# Patient Record
Sex: FEMALE | Race: WHITE | NOT HISPANIC OR LATINO | Employment: UNEMPLOYED | URBAN - METROPOLITAN AREA
[De-identification: names, ages, dates, MRNs, and addresses within clinical notes are randomized per-mention and may not be internally consistent; named-entity substitution may affect disease eponyms.]

---
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
## 2022-02-23 ENCOUNTER — PATIENT MESSAGE (OUTPATIENT)
Dept: PEDIATRIC GASTROENTEROLOGY | Facility: CLINIC | Age: 22
End: 2022-02-23

## 2022-03-10 ENCOUNTER — PATIENT MESSAGE (OUTPATIENT)
Dept: PEDIATRIC GASTROENTEROLOGY | Facility: CLINIC | Age: 22
End: 2022-03-10

## 2022-11-16 ENCOUNTER — TELEPHONE (OUTPATIENT)
Dept: SURGERY | Facility: CLINIC | Age: 22
End: 2022-11-16
Payer: MEDICAID

## 2022-11-17 ENCOUNTER — OFFICE VISIT (OUTPATIENT)
Dept: SURGERY | Facility: CLINIC | Age: 22
End: 2022-11-17
Payer: MEDICAID

## 2022-11-17 VITALS
SYSTOLIC BLOOD PRESSURE: 104 MMHG | HEART RATE: 89 BPM | HEIGHT: 62 IN | DIASTOLIC BLOOD PRESSURE: 65 MMHG | BODY MASS INDEX: 20 KG/M2 | WEIGHT: 108.69 LBS

## 2022-11-17 DIAGNOSIS — K59.1 FUNCTIONAL DIARRHEA: Primary | ICD-10-CM

## 2022-11-17 DIAGNOSIS — F41.9 ANXIETY: ICD-10-CM

## 2022-11-17 DIAGNOSIS — K62.5 BRBPR (BRIGHT RED BLOOD PER RECTUM): ICD-10-CM

## 2022-11-17 PROCEDURE — 3008F BODY MASS INDEX DOCD: CPT | Mod: CPTII,,, | Performed by: NURSE PRACTITIONER

## 2022-11-17 PROCEDURE — 1159F PR MEDICATION LIST DOCUMENTED IN MEDICAL RECORD: ICD-10-PCS | Mod: CPTII,,, | Performed by: NURSE PRACTITIONER

## 2022-11-17 PROCEDURE — 99999 PR PBB SHADOW E&M-EST. PATIENT-LVL IV: ICD-10-PCS | Mod: PBBFAC,,, | Performed by: NURSE PRACTITIONER

## 2022-11-17 PROCEDURE — 99999 PR PBB SHADOW E&M-EST. PATIENT-LVL IV: CPT | Mod: PBBFAC,,, | Performed by: NURSE PRACTITIONER

## 2022-11-17 PROCEDURE — 3074F SYST BP LT 130 MM HG: CPT | Mod: CPTII,,, | Performed by: NURSE PRACTITIONER

## 2022-11-17 PROCEDURE — 3078F PR MOST RECENT DIASTOLIC BLOOD PRESSURE < 80 MM HG: ICD-10-PCS | Mod: CPTII,,, | Performed by: NURSE PRACTITIONER

## 2022-11-17 PROCEDURE — 1159F MED LIST DOCD IN RCRD: CPT | Mod: CPTII,,, | Performed by: NURSE PRACTITIONER

## 2022-11-17 PROCEDURE — 99205 PR OFFICE/OUTPT VISIT, NEW, LEVL V, 60-74 MIN: ICD-10-PCS | Mod: S$PBB,,, | Performed by: NURSE PRACTITIONER

## 2022-11-17 PROCEDURE — 3074F PR MOST RECENT SYSTOLIC BLOOD PRESSURE < 130 MM HG: ICD-10-PCS | Mod: CPTII,,, | Performed by: NURSE PRACTITIONER

## 2022-11-17 PROCEDURE — 3078F DIAST BP <80 MM HG: CPT | Mod: CPTII,,, | Performed by: NURSE PRACTITIONER

## 2022-11-17 PROCEDURE — 3008F PR BODY MASS INDEX (BMI) DOCUMENTED: ICD-10-PCS | Mod: CPTII,,, | Performed by: NURSE PRACTITIONER

## 2022-11-17 PROCEDURE — 99205 OFFICE O/P NEW HI 60 MIN: CPT | Mod: S$PBB,,, | Performed by: NURSE PRACTITIONER

## 2022-11-17 PROCEDURE — 99214 OFFICE O/P EST MOD 30 MIN: CPT | Mod: PBBFAC | Performed by: NURSE PRACTITIONER

## 2022-11-17 RX ORDER — HYDROCORTISONE 25 MG/G
CREAM TOPICAL 2 TIMES DAILY
Qty: 28 G | Refills: 1 | Status: SHIPPED | OUTPATIENT
Start: 2022-11-17

## 2022-11-17 RX ORDER — AMOXICILLIN 875 MG/1
TABLET, FILM COATED ORAL
COMMUNITY
Start: 2022-11-12 | End: 2023-04-03

## 2022-11-17 RX ORDER — NITROFURANTOIN 25; 75 MG/1; MG/1
100 CAPSULE ORAL
COMMUNITY
End: 2023-04-03

## 2022-11-17 RX ORDER — ALPRAZOLAM 1 MG/1
1 TABLET ORAL
Qty: 1 TABLET | Refills: 0 | Status: SHIPPED | OUTPATIENT
Start: 2022-11-17 | End: 2023-04-03

## 2022-11-17 NOTE — PATIENT INSTRUCTIONS
Start fiber supplement such as Fibercon (capsule) Citrucel or Metamucil every day, increase as tolerated per  instructions to achieve one to three well formed and easy to pass stools per 7 day period  Water intake of 64 oz per day  May use prescribed cortisone cream to hemorrhoids twice a day not to exceed more than 2 weeks at a time. Take 2 week medication vacation holiday then resume as needed.  Warm sitz baths as needed  Do not sit on the toilet for more than 5 mins at a time     Try Michael almonte peppermint capsules for the nausea and abdominal cramping. These can be found on amazon

## 2022-11-17 NOTE — PROGRESS NOTES
"CRS Office Visit History and Physical    Referring Md:   Lupis Davila Md  [address] [place] [address]    SUBJECTIVE:     Chief Complaint: BRBPR    History of Present Illness:  The patient is new patient to this practice.   Course is as follows:  Patient is a 21 y.o. female presents with BRBPR. This started 10/27/22, had three episodes of passing blood without a BM.  She has constipation and diarrhea.   Has never had scopes.   She is not taking anything   Patient not talking today, She has anxiety. Her mother is answering her questions today for her.   Occasional abs cramping, usually around a BM. Pain does correlated w a BM,   She has seen peds GI in the past for IBS sx. No scopes  + Occasional straining.   +sits on toilet longer than 5 mins    Last Colonoscopy: never.   Family history of colorectal cancer or IBD: Great grandfather with colon cancer.    Review of patient's allergies indicates:  No Known Allergies    Past Medical History:   Diagnosis Date    Headache      Past Surgical History:   Procedure Laterality Date    ADENOIDECTOMY      TYMPANOSTOMY TUBE PLACEMENT       Family History   Problem Relation Age of Onset    Hypertension Mother     Hyperlipidemia Mother     Other Mother         porphyria    Cancer Maternal Grandfather     Cancer Paternal Grandmother     Cataracts Paternal Grandmother     Diabetes Paternal Grandmother     Glaucoma Paternal Grandmother     Hypertension Paternal Grandfather      Social History     Tobacco Use    Smoking status: Never   Substance Use Topics    Alcohol use: No    Drug use: No        Review of Systems:  Review of Systems   Constitutional:  Negative for weight loss.   Gastrointestinal:  Positive for nausea. Negative for heartburn, melena and vomiting.     OBJECTIVE:     Vital Signs (Most Recent)  /65 (BP Location: Left arm, Patient Position: Sitting, BP Method: Large (Automatic))   Pulse 89   Ht 5' 2" (1.575 m)   Wt 49.3 kg (108 lb 11 oz)   BMI " 19.88 kg/m²     Physical Exam:  General: White female in no distress   Neuro: Alert and oriented to person, place, and time.  Moves all extremities.     HEENT: No icterus.  Trachea midline  Respiratory: Respirations are even and unlabored, no cough or audible wheezing  Skin: Warm dry and intact, No visible rashes, no jaundice    Labs reviewed today:  Lab Results   Component Value Date    WBC 8.70 11/10/2022    HGB 13.8 11/10/2022    HCT 38.1 11/10/2022     11/10/2022    ALT 18 11/10/2022    AST 29 11/10/2022     11/10/2022    K 4.0 11/10/2022     11/10/2022    CREATININE 0.69 (L) 11/10/2022    BUN 14 11/10/2022    CO2 25 11/10/2022    TSH 3.85 01/14/2019       Imaging reviewed today:  none    Endoscopy reviewed today:  none    Anorectal Exam:    Anal Skin: small external hemorrhoidal skin tag    Digital Rectal Exam:  Resting Tone normal, some spasms  Squeeze normal  Rectocele  absent  Tenderness  absent    Anoscopy:  Verbal consent was obtained.   Clear plastic anoscope inserted.    Hemorrhoids  present  Stigmata of bleeding  present  Stigmata of prolapsed  present  Distal rectal mucosa normal        ASSESSMENT/PLAN:     Josefa was seen today for rectal bleeding.    Diagnoses and all orders for this visit:    Functional diarrhea  -     Celiac Disease Panel; Future  -     C-Reactive Protein; Future  -     Ambulatory referral/consult to Endo Procedure ; Future  -     Ambulatory referral/consult to Gastroenterology; Future  -     hydrocortisone (ANUSOL-HC) 2.5 % rectal cream; Place rectally 2 (two) times daily.    Anxiety  -     ALPRAZolam (XANAX) 1 MG tablet; Take 1 tablet (1 mg total) by mouth On call Procedure for Anxiety (Take 30 mins before scopes).    BRBPR (bright red blood per rectum)    19 y.o. here today w PMH of Anxiety and IBS dx by PEDs GI here for IBS and BRBPR. Pt w very minimal verbal communication today, mother did most of question answering.   Has never been scoped  Needs  adult GI provider    The patient was instructed to:  EGD and colonoscopy w GI, premed xanax one time dose ordered.   Labs ordered but pt would like to defer for now due to anxiety  Needs GI clinic appt  Anusol to hemorrhoids internally  Increase water intake to at least 8-10 glasses of water per day.  Take a daily fiber supplement (Konsyl, Benefiber, Metamucil) and increase dietary intake to 20-30 grams/day.  Avoid straining or spending >5min/event with bowel movements.    Follow-up in clinic pending scope results  60 minutes of time spent collecting data and HPI, anoscopy, exam, discussing plan and completing note      CARLENE Vaughan  Colon and Rectal Surgery

## 2022-11-25 ENCOUNTER — CLINICAL SUPPORT (OUTPATIENT)
Dept: ENDOSCOPY | Facility: HOSPITAL | Age: 22
End: 2022-11-25
Payer: MEDICAID

## 2022-11-25 VITALS — WEIGHT: 107 LBS | HEIGHT: 61 IN | BODY MASS INDEX: 20.2 KG/M2

## 2022-11-25 DIAGNOSIS — K59.1 FUNCTIONAL DIARRHEA: ICD-10-CM

## 2022-11-25 DIAGNOSIS — Z12.11 COLON CANCER SCREENING: Primary | ICD-10-CM

## 2022-11-25 RX ORDER — POLYETHYLENE GLYCOL 3350, SODIUM SULFATE ANHYDROUS, SODIUM BICARBONATE, SODIUM CHLORIDE, POTASSIUM CHLORIDE 236; 22.74; 6.74; 5.86; 2.97 G/4L; G/4L; G/4L; G/4L; G/4L
4 POWDER, FOR SOLUTION ORAL ONCE
Qty: 4000 ML | Refills: 0 | Status: SHIPPED | OUTPATIENT
Start: 2022-11-25 | End: 2022-11-25

## 2022-11-29 ENCOUNTER — PATIENT MESSAGE (OUTPATIENT)
Dept: ENDOSCOPY | Facility: HOSPITAL | Age: 22
End: 2022-11-29
Payer: MEDICAID

## 2022-12-13 ENCOUNTER — ANESTHESIA (OUTPATIENT)
Dept: ENDOSCOPY | Facility: HOSPITAL | Age: 22
End: 2022-12-13
Payer: MEDICAID

## 2022-12-13 ENCOUNTER — HOSPITAL ENCOUNTER (OUTPATIENT)
Facility: HOSPITAL | Age: 22
Discharge: HOME | End: 2022-12-13
Attending: INTERNAL MEDICINE | Admitting: INTERNAL MEDICINE
Payer: MEDICAID

## 2022-12-13 ENCOUNTER — ANESTHESIA EVENT (OUTPATIENT)
Dept: ENDOSCOPY | Facility: HOSPITAL | Age: 22
End: 2022-12-13
Payer: MEDICAID

## 2022-12-13 VITALS
SYSTOLIC BLOOD PRESSURE: 100 MMHG | RESPIRATION RATE: 18 BRPM | BODY MASS INDEX: 19.83 KG/M2 | HEART RATE: 87 BPM | TEMPERATURE: 98 F | WEIGHT: 105 LBS | DIASTOLIC BLOOD PRESSURE: 59 MMHG | OXYGEN SATURATION: 96 % | HEIGHT: 61 IN

## 2022-12-13 DIAGNOSIS — K92.1 HEMATOCHEZIA: ICD-10-CM

## 2022-12-13 LAB
B-HCG UR QL: NEGATIVE
CTP QC/QA: YES

## 2022-12-13 PROCEDURE — E9220 PRA ENDO ANESTHESIA: ICD-10-PCS | Mod: CRNA,,, | Performed by: NURSE ANESTHETIST, CERTIFIED REGISTERED

## 2022-12-13 PROCEDURE — 81025 URINE PREGNANCY TEST: CPT | Performed by: INTERNAL MEDICINE

## 2022-12-13 PROCEDURE — 43239 EGD BIOPSY SINGLE/MULTIPLE: CPT | Performed by: INTERNAL MEDICINE

## 2022-12-13 PROCEDURE — 25000003 PHARM REV CODE 250: Performed by: NURSE ANESTHETIST, CERTIFIED REGISTERED

## 2022-12-13 PROCEDURE — 82657 ENZYME CELL ACTIVITY: CPT | Performed by: PATHOLOGY

## 2022-12-13 PROCEDURE — 63600175 PHARM REV CODE 636 W HCPCS: Performed by: ANESTHESIOLOGY

## 2022-12-13 PROCEDURE — 27201012 HC FORCEPS, HOT/COLD, DISP: Performed by: INTERNAL MEDICINE

## 2022-12-13 PROCEDURE — 63600175 PHARM REV CODE 636 W HCPCS: Performed by: NURSE ANESTHETIST, CERTIFIED REGISTERED

## 2022-12-13 PROCEDURE — 45380 PR COLONOSCOPY,BIOPSY: ICD-10-PCS | Mod: ,,, | Performed by: INTERNAL MEDICINE

## 2022-12-13 PROCEDURE — 45380 COLONOSCOPY AND BIOPSY: CPT | Performed by: INTERNAL MEDICINE

## 2022-12-13 PROCEDURE — 37000008 HC ANESTHESIA 1ST 15 MINUTES: Performed by: INTERNAL MEDICINE

## 2022-12-13 PROCEDURE — 43239 EGD BIOPSY SINGLE/MULTIPLE: CPT | Mod: 51,,, | Performed by: INTERNAL MEDICINE

## 2022-12-13 PROCEDURE — E9220 PRA ENDO ANESTHESIA: HCPCS | Mod: CRNA,,, | Performed by: NURSE ANESTHETIST, CERTIFIED REGISTERED

## 2022-12-13 PROCEDURE — 88305 TISSUE EXAM BY PATHOLOGIST: ICD-10-PCS | Mod: 26,,, | Performed by: PATHOLOGY

## 2022-12-13 PROCEDURE — E9220 PRA ENDO ANESTHESIA: HCPCS | Mod: ANES,,, | Performed by: ANESTHESIOLOGY

## 2022-12-13 PROCEDURE — 45380 COLONOSCOPY AND BIOPSY: CPT | Mod: ,,, | Performed by: INTERNAL MEDICINE

## 2022-12-13 PROCEDURE — 37000009 HC ANESTHESIA EA ADD 15 MINS: Performed by: INTERNAL MEDICINE

## 2022-12-13 PROCEDURE — E9220 PRA ENDO ANESTHESIA: ICD-10-PCS | Mod: ANES,,, | Performed by: ANESTHESIOLOGY

## 2022-12-13 PROCEDURE — 88305 TISSUE EXAM BY PATHOLOGIST: CPT | Performed by: PATHOLOGY

## 2022-12-13 PROCEDURE — 43239 PR EGD, FLEX, W/BIOPSY, SGL/MULTI: ICD-10-PCS | Mod: 51,,, | Performed by: INTERNAL MEDICINE

## 2022-12-13 PROCEDURE — 25000003 PHARM REV CODE 250: Performed by: INTERNAL MEDICINE

## 2022-12-13 PROCEDURE — 88305 TISSUE EXAM BY PATHOLOGIST: CPT | Mod: 26,,, | Performed by: PATHOLOGY

## 2022-12-13 RX ORDER — PHENYLEPHRINE HYDROCHLORIDE 10 MG/ML
INJECTION INTRAVENOUS
Status: DISCONTINUED | OUTPATIENT
Start: 2022-12-13 | End: 2022-12-13

## 2022-12-13 RX ORDER — FENTANYL CITRATE 50 UG/ML
INJECTION, SOLUTION INTRAMUSCULAR; INTRAVENOUS
Status: DISCONTINUED | OUTPATIENT
Start: 2022-12-13 | End: 2022-12-13

## 2022-12-13 RX ORDER — ONDANSETRON 2 MG/ML
INJECTION INTRAMUSCULAR; INTRAVENOUS
Status: DISCONTINUED | OUTPATIENT
Start: 2022-12-13 | End: 2022-12-13

## 2022-12-13 RX ORDER — MIDAZOLAM HYDROCHLORIDE 1 MG/ML
INJECTION, SOLUTION INTRAMUSCULAR; INTRAVENOUS
Status: DISCONTINUED | OUTPATIENT
Start: 2022-12-13 | End: 2022-12-13

## 2022-12-13 RX ORDER — LIDOCAINE HYDROCHLORIDE 20 MG/ML
INJECTION INTRAVENOUS
Status: DISCONTINUED | OUTPATIENT
Start: 2022-12-13 | End: 2022-12-13

## 2022-12-13 RX ORDER — PROPOFOL 10 MG/ML
VIAL (ML) INTRAVENOUS CONTINUOUS PRN
Status: DISCONTINUED | OUTPATIENT
Start: 2022-12-13 | End: 2022-12-13

## 2022-12-13 RX ORDER — PROPOFOL 10 MG/ML
VIAL (ML) INTRAVENOUS
Status: DISCONTINUED | OUTPATIENT
Start: 2022-12-13 | End: 2022-12-13

## 2022-12-13 RX ORDER — ESMOLOL HYDROCHLORIDE 10 MG/ML
INJECTION INTRAVENOUS
Status: DISCONTINUED | OUTPATIENT
Start: 2022-12-13 | End: 2022-12-13

## 2022-12-13 RX ORDER — SODIUM CHLORIDE 9 MG/ML
INJECTION, SOLUTION INTRAVENOUS CONTINUOUS
Status: DISCONTINUED | OUTPATIENT
Start: 2022-12-13 | End: 2022-12-13 | Stop reason: HOSPADM

## 2022-12-13 RX ADMIN — PROPOFOL 30 MG: 10 INJECTION, EMULSION INTRAVENOUS at 02:12

## 2022-12-13 RX ADMIN — SODIUM CHLORIDE: 0.9 INJECTION, SOLUTION INTRAVENOUS at 01:12

## 2022-12-13 RX ADMIN — PROPOFOL 200 MCG/KG/MIN: 10 INJECTION, EMULSION INTRAVENOUS at 01:12

## 2022-12-13 RX ADMIN — PROPOFOL 50 MG: 10 INJECTION, EMULSION INTRAVENOUS at 01:12

## 2022-12-13 RX ADMIN — ESMOLOL HYDROCHLORIDE 10 MG: 100 INJECTION, SOLUTION INTRAVENOUS at 02:12

## 2022-12-13 RX ADMIN — SODIUM CHLORIDE: 0.9 INJECTION, SOLUTION INTRAVENOUS at 02:12

## 2022-12-13 RX ADMIN — FENTANYL CITRATE 25 MCG: 50 INJECTION, SOLUTION INTRAMUSCULAR; INTRAVENOUS at 02:12

## 2022-12-13 RX ADMIN — PHENYLEPHRINE HYDROCHLORIDE 100 MCG: 10 INJECTION INTRAVENOUS at 02:12

## 2022-12-13 RX ADMIN — LIDOCAINE HYDROCHLORIDE 60 MG: 20 INJECTION INTRAVENOUS at 01:12

## 2022-12-13 RX ADMIN — ONDANSETRON 4 MG: 2 INJECTION INTRAMUSCULAR; INTRAVENOUS at 02:12

## 2022-12-13 RX ADMIN — GLYCOPYRROLATE 0.2 MG: 0.2 INJECTION, SOLUTION INTRAMUSCULAR; INTRAVENOUS at 01:12

## 2022-12-13 RX ADMIN — PROPOFOL 30 MG: 10 INJECTION, EMULSION INTRAVENOUS at 01:12

## 2022-12-13 RX ADMIN — MIDAZOLAM HYDROCHLORIDE 2 MG: 1 INJECTION, SOLUTION INTRAMUSCULAR; INTRAVENOUS at 01:12

## 2022-12-13 NOTE — PROVATION PATIENT INSTRUCTIONS
Discharge Summary/Instructions after an Endoscopic Procedure  Patient Name: Josefa Ernst  Patient MRN: [id number]  Patient YOB: 2000 Tuesday, December 13, 2022  Sincere Jiang MD  Dear patient,  As a result of recent federal legislation (The Federal Cures Act), you may   receive lab or pathology results from your procedure in your [facility]   account before your physician is able to contact you. Your physician or   their representative will relay the results to you with their   recommendations at their soonest availability.  Thank you,  RESTRICTIONS:  During your procedure today, you received medications for sedation.  These   medications may affect your judgment, balance and coordination.  Therefore,   for 24 hours, you have the following restrictions:   - DO NOT drive a car, operate machinery, make legal/financial decisions,   sign important papers or drink alcohol.    ACTIVITY:  Today: no heavy lifting, straining or running due to procedural   sedation/anesthesia.  The following day: return to full activity including work.  DIET:  Eat and drink normally unless instructed otherwise.     TREATMENT FOR COMMON SIDE EFFECTS:  - Mild abdominal pain, nausea, belching, bloating or excessive gas:  rest,   eat lightly and use a heating pad.  - Sore Throat: treat with throat lozenges and/or gargle with warm salt   water.  - Because air was used during the procedure, expelling large amounts of air   from your rectum or belching is normal.  - If a bowel prep was taken, you may not have a bowel movement for 1-3 days.    This is normal.  SYMPTOMS TO WATCH FOR AND REPORT TO YOUR PHYSICIAN:  1. Abdominal pain or bloating, other than gas cramps.  2. Chest pain.  3. Back pain.  4. Signs of infection such as: chills or fever occurring within 24 hours   after the procedure.  5. Rectal bleeding, which would show as bright red, maroon, or black stools.   (A tablespoon of blood from the rectum is not serious,  especially if   hemorrhoids are present.)  6. Vomiting.  7. Weakness or dizziness.  GO DIRECTLY TO THE NEAREST EMERGENCY ROOM IF YOU HAVE ANY OF THE FOLLOWING:      Difficulty breathing              Chills and/or fever over 101 F   Persistent vomiting and/or vomiting blood   Severe abdominal pain   Severe chest pain   Black, tarry stools   Bleeding- more than one tablespoon   Any other symptom or condition that you feel may need urgent attention  Your doctor recommends these additional instructions:  If any biopsies were taken, your doctors clinic will contact you in 1 to 2   weeks with any results.  - Discharge patient to home.   - Await pathology results.   - Telephone endoscopist for pathology results in 3 weeks.   - Repeat colonoscopy (date not yet determined).   - Return to nurse practitioner.   - The findings and recommendations were discussed with the patient.  For questions, problems or results please call your physician - Sincere Jiang MD at Work:  [phone].  [facility], EMERGENCY ROOM PHONE NUMBER: [phone]  IF A COMPLICATION OR EMERGENCY SITUATION ARISES AND YOU ARE UNABLE TO REACH   YOUR PHYSICIAN - GO DIRECTLY TO THE EMERGENCY ROOM.  Sincere Jiang MD  12/13/2022 2:50:49 PM  This report has been verified and signed electronically.  Dear patient,  As a result of recent federal legislation (The Federal Cures Act), you may   receive lab or pathology results from your procedure in your [facility]   account before your physician is able to contact you. Your physician or   their representative will relay the results to you with their   recommendations at their soonest availability.  Thank you,  PROVATION

## 2022-12-13 NOTE — ANESTHESIA POSTPROCEDURE EVALUATION
Anesthesia Post Evaluation    Patient: Josefa Ernst    Procedure(s) Performed: Procedure(s) (LRB):  COLONOSCOPY (N/A)  EGD (ESOPHAGOGASTRODUODENOSCOPY) (N/A)    Final Anesthesia Type: general      Patient location during evaluation: GI PACU  Patient participation: Yes- Able to Participate  Level of consciousness: awake and alert  Post-procedure vital signs: reviewed and stable  Pain management: adequate  Airway patency: patent    PONV status at discharge: No PONV  Anesthetic complications: no      Cardiovascular status: hemodynamically stable  Respiratory status: unassisted and spontaneous ventilation  Hydration status: euvolemic  Follow-up not needed.          Vitals Value Taken Time   /59 12/13/22 1518   Temp 36.5 °C (97.7 °F) 12/13/22 1453   Pulse 87 12/13/22 1518   Resp 18 12/13/22 1518   SpO2 96 % 12/13/22 1518         Event Time   Out of Recovery 15:34:53         Pain/Piyush Score: Piyush Score: 10 (12/13/2022  3:06 PM)

## 2022-12-13 NOTE — ANESTHESIA PREPROCEDURE EVALUATION
12/13/2022  Josefa Ernst is a 22 y.o., female.    Active Problem List with Overview Notes    Diagnosis Date Noted    Anxiety 01/23/2019    Syncope 01/14/2019    Hypoglycemia 01/14/2019    Migraine without aura and without status migrainosus, not intractable 06/01/2018    Lower abdominal pain 05/29/2018    Rectal bleeding 05/29/2018    Abnormal stools 05/29/2018     Past Surgical History:   Procedure Laterality Date    ADENOIDECTOMY      TYMPANOSTOMY TUBE PLACEMENT       Results for orders placed or performed during the hospital encounter of 01/14/19   EKG 12-lead    Collection Time: 01/14/19 11:59 AM    Narrative    Test Reason : R55,  Blood Pressure :  mmHG  Vent. Rate : 073 BPM     Atrial Rate : 073 BPM     P-R Int : 132 ms          QRS Dur : 088 ms      QT Int : 392 ms       P-R-T Axes : 075 069 046 degrees     QTc Int : 431 ms    Normal sinus rhythm  Normal ECG  No previous ECGs available  Confirmed by Marlee Britton MD ([id number]) on 1/15/2019 2:32:42 PM    Referred By: JULIA VIVAS MD           Confirmed By:Marlee Britton MD       Pre-op Assessment    I have reviewed the Patient Summary Reports.    I have reviewed the NPO Status.   I have reviewed the Medications.     Review of Systems  Anesthesia Hx:  No problems with previous Anesthesia   Denies Personal Hx of Anesthesia complications.   Social:  Non-Smoker, Social Alcohol Use    Hematology/Oncology:  Hematology Normal   Oncology Normal     EENT/Dental:EENT/Dental Normal   Cardiovascular:  Cardiovascular Normal     Pulmonary:  Pulmonary Normal    Renal/:  Renal/ Normal     Hepatic/GI:  Hepatic/GI Normal    Musculoskeletal:  Musculoskeletal Normal    Neurological:   Headaches    Endocrine:  Endocrine Normal    Dermatological:  Skin Normal    Psych:   anxiety          Physical Exam  General: Well nourished, Cooperative, Alert and  Oriented    Airway:  Mallampati: II   Mouth Opening: Normal  TM Distance: Normal  Tongue: Normal  Neck ROM: Normal ROM    Dental:  Intact    Chest/Lungs:  Clear to auscultation, Normal Respiratory Rate    Heart:  Rate: Normal  Rhythm: Regular Rhythm        Anesthesia Plan  Type of Anesthesia, risks & benefits discussed:    Anesthesia Type: Gen Natural Airway  Intra-op Monitoring Plan: Standard ASA Monitors  Post Op Pain Control Plan: multimodal analgesia  Induction:  IV  Informed Consent: Informed consent signed with the Patient and all parties understand the risks and agree with anesthesia plan.  All questions answered.   ASA Score: 1  Day of Surgery Review of History & Physical: H&P Update referred to the surgeon/provider.    Ready For Surgery From Anesthesia Perspective.     .

## 2022-12-13 NOTE — TRANSFER OF CARE
"Anesthesia Transfer of Care Note    Patient: Josefa Ernst    Procedure(s) Performed: Procedure(s) (LRB):  COLONOSCOPY (N/A)  EGD (ESOPHAGOGASTRODUODENOSCOPY) (N/A)    Patient location: GI    Anesthesia Type: general    Transport from OR: Transported from OR on room air with adequate spontaneous ventilation    Post pain: adequate analgesia    Post assessment: no apparent anesthetic complications and tolerated procedure well    Post vital signs: stable    Level of consciousness: awake, alert and oriented    Nausea/Vomiting: no nausea/vomiting    Complications: none    Transfer of care protocol was followed      Last vitals:   Visit Vitals  BP (!) 101/56 (BP Location: Left arm, Patient Position: Lying)   Pulse 102   Temp 36.7 °C (98.1 °F)   Resp 14   Ht 5' 1" (1.549 m)   Wt 47.6 kg (105 lb)   LMP 12/12/2022 (Exact Date)   SpO2 96%   Breastfeeding No   BMI 19.84 kg/m²     "

## 2022-12-13 NOTE — PROVATION PATIENT INSTRUCTIONS
Discharge Summary/Instructions after an Endoscopic Procedure  Patient Name: Josefa Ernst  Patient MRN: [id number]  Patient YOB: 2000 Tuesday, December 13, 2022  Sincere Jiang MD  Dear patient,  As a result of recent federal legislation (The Federal Cures Act), you may   receive lab or pathology results from your procedure in your [facility]   account before your physician is able to contact you. Your physician or   their representative will relay the results to you with their   recommendations at their soonest availability.  Thank you,  RESTRICTIONS:  During your procedure today, you received medications for sedation.  These   medications may affect your judgment, balance and coordination.  Therefore,   for 24 hours, you have the following restrictions:   - DO NOT drive a car, operate machinery, make legal/financial decisions,   sign important papers or drink alcohol.    ACTIVITY:  Today: no heavy lifting, straining or running due to procedural   sedation/anesthesia.  The following day: return to full activity including work.  DIET:  Eat and drink normally unless instructed otherwise.     TREATMENT FOR COMMON SIDE EFFECTS:  - Mild abdominal pain, nausea, belching, bloating or excessive gas:  rest,   eat lightly and use a heating pad.  - Sore Throat: treat with throat lozenges and/or gargle with warm salt   water.  - Because air was used during the procedure, expelling large amounts of air   from your rectum or belching is normal.  - If a bowel prep was taken, you may not have a bowel movement for 1-3 days.    This is normal.  SYMPTOMS TO WATCH FOR AND REPORT TO YOUR PHYSICIAN:  1. Abdominal pain or bloating, other than gas cramps.  2. Chest pain.  3. Back pain.  4. Signs of infection such as: chills or fever occurring within 24 hours   after the procedure.  5. Rectal bleeding, which would show as bright red, maroon, or black stools.   (A tablespoon of blood from the rectum is not serious,  especially if   hemorrhoids are present.)  6. Vomiting.  7. Weakness or dizziness.  GO DIRECTLY TO THE NEAREST EMERGENCY ROOM IF YOU HAVE ANY OF THE FOLLOWING:      Difficulty breathing              Chills and/or fever over 101 F   Persistent vomiting and/or vomiting blood   Severe abdominal pain   Severe chest pain   Black, tarry stools   Bleeding- more than one tablespoon   Any other symptom or condition that you feel may need urgent attention  Your doctor recommends these additional instructions:  If any biopsies were taken, your doctors clinic will contact you in 1 to 2   weeks with any results.  - Discharge patient to home.   - Await pathology results.   - Return to nurse practitioner.   - Telephone endoscopist for pathology results in 3 weeks.   - The findings and recommendations were discussed with the patient.  For questions, problems or results please call your physician - Sincere Jiang MD at Work:  [phone].  [facility], EMERGENCY ROOM PHONE NUMBER: [phone]  IF A COMPLICATION OR EMERGENCY SITUATION ARISES AND YOU ARE UNABLE TO REACH   YOUR PHYSICIAN - GO DIRECTLY TO THE EMERGENCY ROOM.  Sincere Jiang MD  12/13/2022 2:51:12 PM  This report has been verified and signed electronically.  Dear patient,  As a result of recent federal legislation (The Federal Cures Act), you may   receive lab or pathology results from your procedure in your [facility]   account before your physician is able to contact you. Your physician or   their representative will relay the results to you with their   recommendations at their soonest availability.  Thank you,  PROVATION

## 2022-12-13 NOTE — H&P
Short Stay Endoscopy History and Physical    PCP - Lupis Davila MD     Procedure - EGD & colonoscopy  ASA - per anesthesia  Mallampati - per anesthesia  History of Anesthesia problems - no  Family history Anesthesia problems -  no   Plan of anesthesia - General    HPI:  This is a 22 y.o. female here for evaluation of :     Hematochezia, diarrhea   No prior hx of EGD or colonoscopy. She has seen peds GI in the past for IBS sx.  Family hx of CRC in maternal great grandfather. No hx of other GI/ cancers.       ROS:  Constitutional: No fevers, chills, No weight loss  CV: No chest pain  Pulm: No cough, No shortness of breath  Ophtho: No vision changes  GI: see HPI  Derm: No rash    Medical History:  has a past medical history of Headache.    Surgical History:  has a past surgical history that includes Adenoidectomy and Tympanostomy tube placement.    Family History: family history includes Cancer in her maternal grandfather and paternal grandmother; Cataracts in her paternal grandmother; Diabetes in her paternal grandmother; Glaucoma in her paternal grandmother; Hyperlipidemia in her mother; Hypertension in her mother and paternal grandfather; Other in her mother.. Otherwise no colon cancer, inflammatory bowel disease, or GI malignancies.    Social History:  reports that she has never smoked. She does not have any smokeless tobacco history on file. She reports that she does not drink alcohol and does not use drugs.    Review of patient's allergies indicates:  No Known Allergies    Medications:   Medications Prior to Admission   Medication Sig Dispense Refill Last Dose    ALPRAZolam (XANAX) 1 MG tablet Take 1 tablet (1 mg total) by mouth On call Procedure for Anxiety (Take 30 mins before scopes). (Patient not taking: Reported on 11/25/2022) 1 tablet 0     amoxicillin (AMOXIL) 875 MG tablet        DULoxetine (CYMBALTA) 30 MG capsule Take 30 mg by mouth once daily.       GIANVI, 28, 3-0.02 mg per tablet Take 1  tablet by mouth once daily.  6     hydrocortisone (ANUSOL-HC) 2.5 % rectal cream Place rectally 2 (two) times daily. (Patient not taking: Reported on 11/25/2022) 28 g 1     nitrofurantoin, macrocrystal-monohydrate, (MACROBID) 100 MG capsule Take 100 mg by mouth every 12 (twelve) hours.          Physical Exam:    Vital Signs: There were no vitals filed for this visit.    General Appearance: Well appearing in no acute distress  Eyes:    No scleral icterus  ENT: Neck supple, Lips, mucosa, and tongue normal; teeth and gums normal  Abdomen: Soft, non tender, non distended with normal bowel sounds. No hepatosplenomegaly, ascites, or mass.  Extremities: No edema  Skin: No rash    Labs:  Lab Results   Component Value Date    WBC 8.70 11/10/2022    HGB 13.8 11/10/2022    HCT 38.1 11/10/2022     11/10/2022    ALT 18 11/10/2022    AST 29 11/10/2022     11/10/2022    K 4.0 11/10/2022     11/10/2022    CREATININE 0.69 (L) 11/10/2022    BUN 14 11/10/2022    CO2 25 11/10/2022    TSH 3.85 01/14/2019       I have explained the risks and benefits of endoscopy procedures to the patient including but not limited to bleeding, perforation, infection, and death.  The patient was asked if they understand and allowed to ask any further questions to their satisfaction.      Giuseppe Purvis MD   PGY-IV, Gastroenterology   2-3 times/wk

## 2022-12-19 LAB
FINAL PATHOLOGIC DIAGNOSIS: NORMAL
Lab: NORMAL

## 2022-12-20 NOTE — PROGRESS NOTES
Small-bowel biopsy show no evidence of lactase deficiency  DISACCHARIDASE ACTIVITY PANEL:   Interpretation:   *NEGATIVE*   In this sample, the activities of the five disaccharidases were normal   indicating that this individual is not affected with a disaccharidase   deficiency.

## 2022-12-21 LAB
FINAL PATHOLOGIC DIAGNOSIS: NORMAL
GROSS: NORMAL
Lab: NORMAL

## 2023-01-16 NOTE — PROGRESS NOTES
Josefa - your EGD and colonoscopy pathology was all benign no evidence of celiac sprue no evidence of H pylori no evidence of Crohn's or ulcerative colitis no evidence of micro scopic colitis no evidence of lactase deficiency.    1. Duodenum, biopsy:   -  Duodenal mucosa with no significant pathologic findings   Comment:   No significant villous architectural distortion or epithelial   lymphocytosis is identified to suggest celiac disease. No granulomas,   parasitic organisms or viral inclusions are identified.  There is no evidence   of dysplasia or malignancy.     2. Stomach, biopsy:       -  Gastric oxyntic and antral mucosa with minimal chronic superficial   gastritis       -  Routine H&E stain is negative for Helicobacter pylori   Comment:  Negative for atrophy or intestinal metaplasia.  Negative for   dysplasia or malignancy.     3. Terminal ileum, biopsy:       -  Ileal mucosa with no significant pathologic findings   Comment:   No granulomas, parasitic organisms or viral inclusions are   identified.  Negative for dysplasia or malignancy.     4. Random colon, biopsy:       -  Colonic mucosa with no significant pathologic findings   Comment:  Features of neither collagenous or lymphocytic colitis are seen. No   evidence of active colitis or chronic primary inflammatory bowel disease is   identified. No granulomas, parasites or viral inclusion are present. There is   no evidence of dysplasia or malignancy.   Comment: Interp By Janel Talley M.D., Signed on 12/21/2022     DISACCHARIDASE ACTIVITY PANEL:   Interpretation:   *NEGATIVE*   In this sample, the activities of the five disaccharidases were normal   indicating that this individual is not affected with a disaccharidase   deficiency.  Interp By Jonh Palomino M.D., Signed on 12/19/2022

## 2023-04-03 ENCOUNTER — OFFICE VISIT (OUTPATIENT)
Dept: GASTROENTEROLOGY | Facility: CLINIC | Age: 23
End: 2023-04-03
Payer: MEDICAID

## 2023-04-03 VITALS
DIASTOLIC BLOOD PRESSURE: 62 MMHG | WEIGHT: 108.25 LBS | HEART RATE: 79 BPM | BODY MASS INDEX: 20.44 KG/M2 | SYSTOLIC BLOOD PRESSURE: 110 MMHG | HEIGHT: 61 IN

## 2023-04-03 DIAGNOSIS — K58.2 IRRITABLE BOWEL SYNDROME WITH BOTH CONSTIPATION AND DIARRHEA: Primary | ICD-10-CM

## 2023-04-03 DIAGNOSIS — R11.0 NAUSEA: ICD-10-CM

## 2023-04-03 PROCEDURE — 99204 OFFICE O/P NEW MOD 45 MIN: CPT | Mod: S$PBB,,, | Performed by: NURSE PRACTITIONER

## 2023-04-03 PROCEDURE — 1160F RVW MEDS BY RX/DR IN RCRD: CPT | Mod: CPTII,,, | Performed by: NURSE PRACTITIONER

## 2023-04-03 PROCEDURE — 3078F DIAST BP <80 MM HG: CPT | Mod: CPTII,,, | Performed by: NURSE PRACTITIONER

## 2023-04-03 PROCEDURE — 1160F PR REVIEW ALL MEDS BY PRESCRIBER/CLIN PHARMACIST DOCUMENTED: ICD-10-PCS | Mod: CPTII,,, | Performed by: NURSE PRACTITIONER

## 2023-04-03 PROCEDURE — 1159F MED LIST DOCD IN RCRD: CPT | Mod: CPTII,,, | Performed by: NURSE PRACTITIONER

## 2023-04-03 PROCEDURE — 3074F PR MOST RECENT SYSTOLIC BLOOD PRESSURE < 130 MM HG: ICD-10-PCS | Mod: CPTII,,, | Performed by: NURSE PRACTITIONER

## 2023-04-03 PROCEDURE — 3074F SYST BP LT 130 MM HG: CPT | Mod: CPTII,,, | Performed by: NURSE PRACTITIONER

## 2023-04-03 PROCEDURE — 99999 PR PBB SHADOW E&M-EST. PATIENT-LVL IV: ICD-10-PCS | Mod: PBBFAC,,, | Performed by: NURSE PRACTITIONER

## 2023-04-03 PROCEDURE — 3008F PR BODY MASS INDEX (BMI) DOCUMENTED: ICD-10-PCS | Mod: CPTII,,, | Performed by: NURSE PRACTITIONER

## 2023-04-03 PROCEDURE — 3008F BODY MASS INDEX DOCD: CPT | Mod: CPTII,,, | Performed by: NURSE PRACTITIONER

## 2023-04-03 PROCEDURE — 99999 PR PBB SHADOW E&M-EST. PATIENT-LVL IV: CPT | Mod: PBBFAC,,, | Performed by: NURSE PRACTITIONER

## 2023-04-03 PROCEDURE — 3078F PR MOST RECENT DIASTOLIC BLOOD PRESSURE < 80 MM HG: ICD-10-PCS | Mod: CPTII,,, | Performed by: NURSE PRACTITIONER

## 2023-04-03 PROCEDURE — 99214 OFFICE O/P EST MOD 30 MIN: CPT | Mod: PBBFAC | Performed by: NURSE PRACTITIONER

## 2023-04-03 PROCEDURE — 1159F PR MEDICATION LIST DOCUMENTED IN MEDICAL RECORD: ICD-10-PCS | Mod: CPTII,,, | Performed by: NURSE PRACTITIONER

## 2023-04-03 PROCEDURE — 99204 PR OFFICE/OUTPT VISIT, NEW, LEVL IV, 45-59 MIN: ICD-10-PCS | Mod: S$PBB,,, | Performed by: NURSE PRACTITIONER

## 2023-04-03 RX ORDER — DICYCLOMINE HYDROCHLORIDE 20 MG/1
TABLET ORAL
COMMUNITY

## 2023-04-03 RX ORDER — ONDANSETRON 4 MG/1
4 TABLET, ORALLY DISINTEGRATING ORAL EVERY 6 HOURS PRN
Qty: 30 TABLET | Refills: 5 | Status: SHIPPED | OUTPATIENT
Start: 2023-04-03

## 2023-04-03 RX ORDER — HYOSCYAMINE SULFATE 0.12 MG/1
0.12 TABLET SUBLINGUAL EVERY 4 HOURS PRN
Qty: 90 TABLET | Refills: 5 | Status: SHIPPED | OUTPATIENT
Start: 2023-04-03

## 2023-04-03 RX ORDER — HYDROXYZINE HYDROCHLORIDE 10 MG/1
10 TABLET, FILM COATED ORAL NIGHTLY
COMMUNITY
Start: 2023-03-28

## 2023-04-03 RX ORDER — TRAZODONE HYDROCHLORIDE 50 MG/1
TABLET ORAL
COMMUNITY

## 2023-04-03 NOTE — PROGRESS NOTES
Clinic Consult:  [facility] Consultation Note    Reason for Consult:  The primary encounter diagnosis was Irritable bowel syndrome with both constipation and diarrhea. A diagnosis of Nausea was also pertinent to this visit.    PCP: Lupis Davila   [id number] / [place] [id number]    HPI:  This is a 22 y.o. female here for evaluation of IBS.   Chronic issue since childhood.   Alternates with constipation, diarrhea, and normal stool. She will have all three within 1-2 week time period.   She also has increased nausea but is also changing mental health medications.     Workup:  EGD (12/2022): normal   Colonoscopy (12/2022): non-bleeding internal hemorrhoids.     Review of Systems   Constitutional:  Negative for fever and weight loss.   HENT:  Negative for sore throat.    Respiratory:  Negative for cough, shortness of breath and wheezing.    Cardiovascular:  Negative for chest pain and palpitations.   Gastrointestinal:  Positive for abdominal pain, constipation, diarrhea and nausea. Negative for blood in stool, heartburn, melena and vomiting.   Genitourinary:  Negative for dysuria and frequency.   Skin:  Negative for itching and rash.   Neurological:  Negative for speech change and weakness.     Medical History:  has a past medical history of Headache.    Surgical History:  has a past surgical history that includes Adenoidectomy; Tympanostomy tube placement; Colonoscopy (N/A, 12/13/2022); and Esophagogastroduodenoscopy (N/A, 12/13/2022).    Family History: family history includes Cancer in her maternal grandfather and paternal grandmother; Cataracts in her paternal grandmother; Diabetes in her paternal grandmother; Glaucoma in her paternal grandmother; Hyperlipidemia in her mother; Hypertension in her mother and paternal grandfather; Other in her mother..     Social History:  reports that she has never smoked. She does not have any smokeless tobacco history on file. She reports that she does  "not drink alcohol and does not use drugs.    Allergies: Reviewed    Home Medications:   Current Outpatient Medications on File Prior to Visit   Medication Sig Dispense Refill    dicyclomine (BENTYL) 20 mg tablet 1 tablet Orally Three times a day      GIANVI, 28, 3-0.02 mg per tablet Take 1 tablet by mouth once daily.  6    hydrocortisone (ANUSOL-HC) 2.5 % rectal cream Place rectally 2 (two) times daily. 28 g 1    hydrOXYzine HCL (ATARAX) 10 MG Tab Take 10 mg by mouth every evening.      ALPRAZolam (XANAX) 1 MG tablet Take 1 tablet (1 mg total) by mouth On call Procedure for Anxiety (Take 30 mins before scopes). (Patient not taking: Reported on 11/25/2022) 1 tablet 0    amoxicillin (AMOXIL) 875 MG tablet       DULoxetine (CYMBALTA) 30 MG capsule Take 30 mg by mouth once daily.      nitrofurantoin, macrocrystal-monohydrate, (MACROBID) 100 MG capsule Take 100 mg by mouth every 12 (twelve) hours.      traZODone (DESYREL) 50 MG tablet 1 tablet at bedtime as needed Orally Once a day for 30 day(s)       No current facility-administered medications on file prior to visit.       Physical Exam:  /62 (BP Location: Left arm, Patient Position: Sitting, BP Method: Medium (Manual))   Pulse 79   Ht 5' 1" (1.549 m)   Wt 49.1 kg (108 lb 3.9 oz)   LMP 03/27/2023 (Approximate)   BMI 20.45 kg/m²   Body mass index is 20.45 kg/m².  Physical Exam  Constitutional:       General: She is not in acute distress.  HENT:      Head: Normocephalic and atraumatic.   Eyes:      General: No scleral icterus.     Conjunctiva/sclera: Conjunctivae normal.   Cardiovascular:      Rate and Rhythm: Normal rate and regular rhythm.      Heart sounds: No murmur heard.  Pulmonary:      Effort: Pulmonary effort is normal. No respiratory distress.      Breath sounds: Normal breath sounds. No wheezing.   Abdominal:      General: Abdomen is flat. Bowel sounds are normal.      Palpations: Abdomen is soft.      Tenderness: There is generalized abdominal " tenderness.   Skin:     General: Skin is warm and dry.   Neurological:      General: No focal deficit present.      Mental Status: She is alert and oriented to person, place, and time.      Cranial Nerves: No cranial nerve deficit.   Psychiatric:         Mood and Affect: Mood normal.         Judgment: Judgment normal.       Labs: Pertinent labs reviewed.  CRC Screening: NA    Assessment:  1. Irritable bowel syndrome with both constipation and diarrhea    2. Nausea        Recommendations:   - Levsin PRN  - Zofran PRN-- nausea may be 2/2 medication change. If continues, would recommend gallbladder US  - fiber  - miralax when constipated.     ** she lives out of town.. can follow up virtually as needed.     Irritable bowel syndrome with both constipation and diarrhea  -     Ambulatory referral/consult to Gastroenterology  -     hyoscyamine (LEVSIN/SL) 0.125 mg Subl; Place 1 tablet (0.125 mg total) under the tongue every 4 (four) hours as needed (abdominal pain).  Dispense: 90 tablet; Refill: 5    Nausea  -     ondansetron (ZOFRAN-ODT) 4 MG TbDL; Take 1 tablet (4 mg total) by mouth every 6 (six) hours as needed (nausea/vomiting).  Dispense: 30 tablet; Refill: 5      Follow up if symptoms worsen or fail to improve.    Thank you so much for allowing me to participate in the care of CARLENE Ruano

## 2023-04-03 NOTE — PATIENT INSTRUCTIONS
Restart fiber  Levsin as needed for abdominal pain-- can also slow the bowels down some  Take Miralax as needed for constipation.